# Patient Record
Sex: FEMALE | Race: WHITE | NOT HISPANIC OR LATINO | URBAN - METROPOLITAN AREA
[De-identification: names, ages, dates, MRNs, and addresses within clinical notes are randomized per-mention and may not be internally consistent; named-entity substitution may affect disease eponyms.]

---

## 2018-08-07 ENCOUNTER — IMPORTED ENCOUNTER (OUTPATIENT)
Dept: URBAN - METROPOLITAN AREA CLINIC 50 | Facility: CLINIC | Age: 78
End: 2018-08-07

## 2018-08-31 ENCOUNTER — IMPORTED ENCOUNTER (OUTPATIENT)
Dept: URBAN - METROPOLITAN AREA CLINIC 50 | Facility: CLINIC | Age: 78
End: 2018-08-31

## 2018-08-31 NOTE — PATIENT DISCUSSION
"""Recommended OTC artificial tears two - four times a day as needed. "" ""Dry Eye Syndrome. Recommend lubrication with artificial tears and flax seed oil 1000mg orally twice a day. """

## 2020-04-10 ENCOUNTER — IMPORTED ENCOUNTER (OUTPATIENT)
Dept: URBAN - METROPOLITAN AREA CLINIC 50 | Facility: CLINIC | Age: 80
End: 2020-04-10

## 2020-04-10 NOTE — PATIENT DISCUSSION
"""Rx given. Call if vision decreases or RD warning signs increases/RD warnings given.  No signs of ""

## 2020-04-10 NOTE — PATIENT DISCUSSION
"""""""Dry Eye Syndrome. Recommend lubrication with artificial tears and flax seed oil 1000mg orally twice a day. """

## 2021-04-05 ENCOUNTER — IMPORTED ENCOUNTER (OUTPATIENT)
Dept: URBAN - METROPOLITAN AREA CLINIC 50 | Facility: CLINIC | Age: 81
End: 2021-04-05

## 2021-04-18 ASSESSMENT — VISUAL ACUITY
OS_CC: J1
OS_BAT: 20/40
OD_SC: 20/40-
OD_SC: 20/30-2
OS_PH: 20/30+2
OS_SC: 20/30-1
OS_SC: 20/40-3
OD_CC: J16@ 16 IN
OS_OTHER: 20/40. 20/80.
OS_SC: 20/30-2
OS_CC: J7@ 18 IN
OD_PH: 20/40
OS_BAT: 20/50
OD_CC: J7@ 18 IN
OS_CC: J16@ 16 IN
OS_OTHER: 20/50. 20/100.
OD_SC: 20/40-3
OD_CC: J1

## 2021-04-18 ASSESSMENT — TONOMETRY
OS_IOP_MMHG: 16
OD_IOP_MMHG: 17
OD_IOP_MMHG: 17
OS_IOP_MMHG: 16
OD_IOP_MMHG: 17
OS_IOP_MMHG: 17

## 2022-07-11 ENCOUNTER — PREPPED CHART (OUTPATIENT)
Dept: URBAN - METROPOLITAN AREA CLINIC 53 | Facility: CLINIC | Age: 82
End: 2022-07-11

## 2022-07-12 ENCOUNTER — COMPREHENSIVE EXAM (OUTPATIENT)
Dept: URBAN - METROPOLITAN AREA CLINIC 53 | Facility: CLINIC | Age: 82
End: 2022-07-12

## 2022-07-12 DIAGNOSIS — H02.831: ICD-10-CM

## 2022-07-12 DIAGNOSIS — H26.492: ICD-10-CM

## 2022-07-12 DIAGNOSIS — H02.834: ICD-10-CM

## 2022-07-12 DIAGNOSIS — H43.813: ICD-10-CM

## 2022-07-12 DIAGNOSIS — H52.4: ICD-10-CM

## 2022-07-12 DIAGNOSIS — H16.223: ICD-10-CM

## 2022-07-12 PROCEDURE — 92134 CPTRZ OPH DX IMG PST SGM RTA: CPT

## 2022-07-12 PROCEDURE — 92015 DETERMINE REFRACTIVE STATE: CPT

## 2022-07-12 PROCEDURE — 92014 COMPRE OPH EXAM EST PT 1/>: CPT

## 2022-07-12 ASSESSMENT — TONOMETRY
OS_IOP_MMHG: 17
OD_IOP_MMHG: 18

## 2022-07-12 ASSESSMENT — VISUAL ACUITY
OS_PH: 20/40
OU_CC: J3 @ 14IN
OS_GLARE: 20/30
OS_SC: 20/50
OD_SC: 20/50
OU_SC: 20/40
OS_GLARE: 20/30

## 2022-07-12 ASSESSMENT — KERATOMETRY
OD_K2POWER_DIOPTERS: 44.25
OD_K1POWER_DIOPTERS: 43.50
OS_K1POWER_DIOPTERS: 43.25
OS_AXISANGLE_DEGREES: 130
OD_AXISANGLE2_DEGREES: 124
OS_K2POWER_DIOPTERS: 44.50
OD_AXISANGLE_DEGREES: 34
OS_AXISANGLE2_DEGREES: 40

## 2022-07-12 NOTE — PATIENT DISCUSSION
Drop regiment discussed with patient. Start PF tears OU 2-3x/day or as needed. Start warm compresses OU BID. Start lid scrubs OU BID. Start gel drops or ointment OU Qhs. Discussed with patient glasses RX not recommended due to vision fluctuation may be related to dryness. Patient does not want to wait would like glasses RX today. RX finalized today but will see patient back in 1-2 months for dry eye f/u with refraction recheck.

## 2024-05-03 ENCOUNTER — COMPREHENSIVE EXAM (OUTPATIENT)
Dept: URBAN - METROPOLITAN AREA CLINIC 49 | Facility: LOCATION | Age: 84
End: 2024-05-03

## 2024-05-03 DIAGNOSIS — H16.223: ICD-10-CM

## 2024-05-03 DIAGNOSIS — H52.4: ICD-10-CM

## 2024-05-03 DIAGNOSIS — H26.492: ICD-10-CM

## 2024-05-03 DIAGNOSIS — H43.813: ICD-10-CM

## 2024-05-03 PROCEDURE — 92015 DETERMINE REFRACTIVE STATE: CPT

## 2024-05-03 PROCEDURE — 99214 OFFICE O/P EST MOD 30 MIN: CPT

## 2024-05-03 ASSESSMENT — VISUAL ACUITY
OS_PH: 20/30-1/+1
OD_SC: 20/30-1
OS_GLARE: 20/70
OS_GLARE: 20/50-1
OS_SC: 20/70-1

## 2024-05-03 ASSESSMENT — KERATOMETRY
OD_K1POWER_DIOPTERS: 43.50
OS_K2POWER_DIOPTERS: 44.50
OS_AXISANGLE2_DEGREES: 40
OS_K1POWER_DIOPTERS: 43.25
OD_AXISANGLE_DEGREES: 34
OD_K2POWER_DIOPTERS: 44.25
OD_AXISANGLE2_DEGREES: 124
OS_AXISANGLE_DEGREES: 130

## 2024-05-03 ASSESSMENT — TONOMETRY
OD_IOP_MMHG: 16
OS_IOP_MMHG: 16

## 2025-05-28 ENCOUNTER — COMPREHENSIVE EXAM (OUTPATIENT)
Age: 85
End: 2025-05-28

## 2025-05-28 DIAGNOSIS — H16.223: ICD-10-CM

## 2025-05-28 DIAGNOSIS — H43.813: ICD-10-CM

## 2025-05-28 DIAGNOSIS — H52.4: ICD-10-CM

## 2025-05-28 DIAGNOSIS — H02.831: ICD-10-CM

## 2025-05-28 DIAGNOSIS — H02.834: ICD-10-CM

## 2025-05-28 DIAGNOSIS — H26.492: ICD-10-CM

## 2025-05-28 PROCEDURE — 92015 DETERMINE REFRACTIVE STATE: CPT

## 2025-05-28 PROCEDURE — 99214 OFFICE O/P EST MOD 30 MIN: CPT
